# Patient Record
Sex: MALE | Race: BLACK OR AFRICAN AMERICAN | NOT HISPANIC OR LATINO | Employment: UNEMPLOYED | URBAN - METROPOLITAN AREA
[De-identification: names, ages, dates, MRNs, and addresses within clinical notes are randomized per-mention and may not be internally consistent; named-entity substitution may affect disease eponyms.]

---

## 2022-04-23 ENCOUNTER — HOSPITAL ENCOUNTER (EMERGENCY)
Facility: HOSPITAL | Age: 22
Discharge: HOME | End: 2022-04-23
Attending: EMERGENCY MEDICINE
Payer: MEDICAID

## 2022-04-23 VITALS
WEIGHT: 181.38 LBS | RESPIRATION RATE: 18 BRPM | HEART RATE: 106 BPM | OXYGEN SATURATION: 98 % | DIASTOLIC BLOOD PRESSURE: 81 MMHG | SYSTOLIC BLOOD PRESSURE: 132 MMHG | TEMPERATURE: 97 F

## 2022-04-23 DIAGNOSIS — J02.0 STREP PHARYNGITIS: Primary | ICD-10-CM

## 2022-04-23 LAB
CTP QC/QA: YES
CTP QC/QA: YES
GROUP A STREP, MOLECULAR: POSITIVE
POC MOLECULAR INFLUENZA A AGN: NEGATIVE
POC MOLECULAR INFLUENZA B AGN: NEGATIVE
SARS-COV-2 RDRP RESP QL NAA+PROBE: NEGATIVE

## 2022-04-23 PROCEDURE — 63600175 PHARM REV CODE 636 W HCPCS: Mod: JG,ER | Performed by: NURSE PRACTITIONER

## 2022-04-23 PROCEDURE — 99284 EMERGENCY DEPT VISIT MOD MDM: CPT | Mod: 25,ER

## 2022-04-23 PROCEDURE — 96372 THER/PROPH/DIAG INJ SC/IM: CPT | Performed by: NURSE PRACTITIONER

## 2022-04-23 PROCEDURE — U0002 COVID-19 LAB TEST NON-CDC: HCPCS | Mod: ER | Performed by: NURSE PRACTITIONER

## 2022-04-23 PROCEDURE — 87651 STREP A DNA AMP PROBE: CPT | Mod: ER | Performed by: NURSE PRACTITIONER

## 2022-04-23 RX ADMIN — PENICILLIN G BENZATHINE 1.2 MILLION UNITS: 1200000 INJECTION, SUSPENSION INTRAMUSCULAR at 01:04

## 2022-04-23 NOTE — ED PROVIDER NOTES
Encounter Date: 4/23/2022       History     Chief Complaint   Patient presents with    Emesis     Pt states he thinks he has food poisoning from eating at a restaurant Wednesday. + nausea + diarrhea. Last episode of vomiting 230am this morning.      Patient presents to ER for emesis, onset 2 days ago.  Associated symptoms include diarrhea, sore throat chills.  He has taken nothing symptoms.  Symptoms have been constant since onset.  Patient states he is able to tolerate p.o. intake.  He denies any known ill contacts.  He denies fever, generalized body aches, shortness of breath, chest pain, abdominal pain, dysuria, decreased urine output.    The history is provided by the patient.     Review of patient's allergies indicates:  No Known Allergies  History reviewed. No pertinent past medical history.  History reviewed. No pertinent surgical history.  History reviewed. No pertinent family history.     Review of Systems   Constitutional: Positive for chills. Negative for fatigue and fever.   HENT: Positive for sore throat. Negative for congestion, ear pain, rhinorrhea and sinus pain.    Eyes: Negative for pain.   Respiratory: Negative for cough and shortness of breath.    Cardiovascular: Negative for chest pain and palpitations.   Gastrointestinal: Positive for diarrhea, nausea and vomiting. Negative for abdominal pain and constipation.   Genitourinary: Negative for decreased urine volume and dysuria.   Musculoskeletal: Negative for back pain, myalgias and neck pain.   Skin: Negative for rash.   Neurological: Negative for weakness and headaches.       Physical Exam     Initial Vitals [04/23/22 1149]   BP Pulse Resp Temp SpO2   132/81 106 18 97.2 °F (36.2 °C) 98 %      MAP       --         Physical Exam    Nursing note and vitals reviewed.  Constitutional: He appears well-developed and well-nourished. He is not diaphoretic. No distress.   HENT:   Head: Normocephalic and atraumatic.   Right Ear: External ear normal.    Left Ear: External ear normal.   Nose: Nose normal.   Mouth/Throat: Uvula is midline and mucous membranes are normal. Posterior oropharyngeal edema and posterior oropharyngeal erythema present.   Eyes: Conjunctivae and EOM are normal. Pupils are equal, round, and reactive to light.   Neck: Neck supple.   Normal range of motion.  Cardiovascular: Normal rate, regular rhythm and intact distal pulses.   Pulmonary/Chest: Breath sounds normal. No respiratory distress.   Abdominal: Abdomen is soft. Bowel sounds are normal. He exhibits no distension. There is no abdominal tenderness. There is no rebound and no guarding.   Musculoskeletal:         General: Normal range of motion.      Cervical back: Normal range of motion and neck supple.     Neurological: He is alert and oriented to person, place, and time. He has normal strength. GCS score is 15. GCS eye subscore is 4. GCS verbal subscore is 5. GCS motor subscore is 6.   Skin: Skin is warm and dry. Capillary refill takes less than 2 seconds.         ED Course   Procedures  Labs Reviewed   GROUP A STREP, MOLECULAR - Abnormal; Notable for the following components:       Result Value    Group A Strep, Molecular Positive (*)     All other components within normal limits   POCT INFLUENZA A/B MOLECULAR   SARS-COV-2 RDRP GENE         Results for orders placed or performed during the hospital encounter of 04/23/22   Group A Strep, Molecular    Specimen: Throat   Result Value Ref Range    Group A Strep, Molecular Positive (A) Negative   POCT Influenza A/B Molecular   Result Value Ref Range    POC Molecular Influenza A Ag Negative Negative, Not Reported    POC Molecular Influenza B Ag Negative Negative, Not Reported     Acceptable Yes    POCT COVID-19 Rapid Screening   Result Value Ref Range    POC Rapid COVID Negative Negative     Acceptable Yes          Imaging Results    None          Medications   penicillin G benzathine (BICILLIN LA) injection  1.2 Million Units (1.2 Million Units Intramuscular Given 4/23/22 1307)          discussed results with patient this time and he verbalized understanding with no further questions or concerns.  Patient requesting penicillin IM injection for treatment, ordered and administered.  Patient with no further questions or concerns.  Patient states comfortable discharge home.    Regarding STREP THROAT, I recommended that the patient get more rest, drink plenty of fluids, and take all medications as prescribed.  Other recommendations to manage symptoms associated with strep throat include: drinking juice, milk shakes, tea, or soup if throat is too sore to eat solid food; gargling with a small amount of warm salt water (mix 1 teaspoon of salt and 1 cup of warm water to make salt water; and suck on crushed ice, hard candy, cough drops, or throat lozenges). To prevent the spread of strep throat, I reiterated the importance of not sharing food or drinks with others, washing hands frequently, and replacing toothbrush 24 hours after taking antibiotics. Patient will be able to return to work or school 24 hours after initiating antibiotic treatment.    I discussed with patient that evaluation in the ED does not suggest any emergent or life threatening medical conditions requiring immediate intervention beyond what was provided in the ED, and I believe patient is safe for discharge. Regardless, an unremarkable evaluation in the ED does not preclude the development or presence of a serious of life threatening condition. As such, patient was instructed to return immediately for any worsening or change in current symptoms.                   Clinical Impression:   Final diagnoses:  [J02.0] Strep pharyngitis (Primary)          ED Disposition Condition    Discharge Stable        ED Prescriptions     None        Follow-up Information     Follow up With Specialties Details Why Contact Info    Follow up with your PCP in 2 days        [facility]  - Emergency Dept Emergency Medicine  As needed, If symptoms worsen [id number]  [facility] [id number] [phone]           Terry Randall, DENISE  04/24/22 4019